# Patient Record
Sex: MALE | Race: WHITE | NOT HISPANIC OR LATINO
[De-identification: names, ages, dates, MRNs, and addresses within clinical notes are randomized per-mention and may not be internally consistent; named-entity substitution may affect disease eponyms.]

---

## 2023-01-19 ENCOUNTER — APPOINTMENT (OUTPATIENT)
Dept: SURGERY | Facility: CLINIC | Age: 78
End: 2023-01-19
Payer: MEDICARE

## 2023-01-19 ENCOUNTER — RESULT REVIEW (OUTPATIENT)
Age: 78
End: 2023-01-19

## 2023-01-19 PROBLEM — Z00.00 ENCOUNTER FOR PREVENTIVE HEALTH EXAMINATION: Status: ACTIVE | Noted: 2023-01-19

## 2023-01-19 PROCEDURE — 21931 EXC BACK LES SC 3 CM/>: CPT

## 2023-01-19 NOTE — PROCEDURE
[FreeTextEntry1] : Witnessed consent obtained for excision of sebaceous cyst of right upper back.  Are prepped with Betadine and sterile dressing.  Skin and subcutaneous tissue around cyst infiltrated with 1% lidocaine for local anesthesia.  Using 15 blade elliptical incision made over cyst cavity.  Using sharp dissection cyst cavity excised in its entirety.  Cyst sent off to pathology.  Wound inspected and hematosis noted.  Incision closed in two layers with 3-0 Vicryl interrupted sutures followed by 4-0 nylon vertical mattress sutures.  Dressing with 4x4 and Tegaderm.  Patient tolerated procedure well.

## 2023-01-25 ENCOUNTER — APPOINTMENT (OUTPATIENT)
Dept: SURGERY | Facility: CLINIC | Age: 78
End: 2023-01-25
Payer: MEDICARE

## 2023-01-25 VITALS
SYSTOLIC BLOOD PRESSURE: 151 MMHG | WEIGHT: 142 LBS | BODY MASS INDEX: 20.33 KG/M2 | HEIGHT: 70 IN | HEART RATE: 80 BPM | DIASTOLIC BLOOD PRESSURE: 75 MMHG

## 2023-01-25 DIAGNOSIS — Z86.39 PERSONAL HISTORY OF OTHER ENDOCRINE, NUTRITIONAL AND METABOLIC DISEASE: ICD-10-CM

## 2023-01-25 DIAGNOSIS — L72.3 SEBACEOUS CYST: ICD-10-CM

## 2023-01-25 PROCEDURE — 99024 POSTOP FOLLOW-UP VISIT: CPT

## 2023-01-25 RX ORDER — CLINDAMYCIN HYDROCHLORIDE 150 MG/1
150 CAPSULE ORAL EVERY 6 HOURS
Qty: 28 | Refills: 0 | Status: ACTIVE | COMMUNITY
Start: 2023-01-25 | End: 1900-01-01

## 2023-01-25 NOTE — PHYSICAL EXAM
[de-identified] : NAD, well appearing [de-identified] : cyst excision site with interrupted nylon sutures. mild erythema.  no drainage or collection

## 2023-01-25 NOTE — HISTORY OF PRESENT ILLNESS
[de-identified] : s/p excision of back cyst: pathology ruptured epidermal inclusion cyst with abscess [de-identified] : Doing well.  No issues